# Patient Record
Sex: MALE | Race: WHITE | URBAN - METROPOLITAN AREA
[De-identification: names, ages, dates, MRNs, and addresses within clinical notes are randomized per-mention and may not be internally consistent; named-entity substitution may affect disease eponyms.]

---

## 2017-01-26 ENCOUNTER — APPOINTMENT (OUTPATIENT)
Dept: GENERAL RADIOLOGY | Facility: CLINIC | Age: 62
End: 2017-01-26
Attending: EMERGENCY MEDICINE
Payer: MEDICARE

## 2017-01-26 ENCOUNTER — APPOINTMENT (OUTPATIENT)
Dept: CT IMAGING | Facility: CLINIC | Age: 62
End: 2017-01-26
Attending: EMERGENCY MEDICINE
Payer: MEDICARE

## 2017-01-26 ENCOUNTER — HOSPITAL ENCOUNTER (EMERGENCY)
Facility: CLINIC | Age: 62
Discharge: ANOTHER HOSPITAL | End: 2017-01-26
Attending: EMERGENCY MEDICINE | Admitting: EMERGENCY MEDICINE
Payer: MEDICARE

## 2017-01-26 VITALS
SYSTOLIC BLOOD PRESSURE: 106 MMHG | OXYGEN SATURATION: 94 % | TEMPERATURE: 97 F | RESPIRATION RATE: 11 BRPM | DIASTOLIC BLOOD PRESSURE: 86 MMHG

## 2017-01-26 DIAGNOSIS — R55 SYNCOPE, UNSPECIFIED SYNCOPE TYPE: ICD-10-CM

## 2017-01-26 DIAGNOSIS — R56.9 CONVULSIONS, UNSPECIFIED CONVULSION TYPE (H): ICD-10-CM

## 2017-01-26 DIAGNOSIS — R00.1 SINUS BRADYCARDIA: ICD-10-CM

## 2017-01-26 LAB
ALBUMIN SERPL-MCNC: 3.2 G/DL (ref 3.4–5)
ALBUMIN UR-MCNC: NEGATIVE MG/DL
ALP SERPL-CCNC: 96 U/L (ref 40–150)
ALT SERPL W P-5'-P-CCNC: 71 U/L (ref 0–70)
ANION GAP SERPL CALCULATED.3IONS-SCNC: 13 MMOL/L (ref 6–17)
ANION GAP SERPL CALCULATED.3IONS-SCNC: 9 MMOL/L (ref 3–14)
APPEARANCE UR: CLEAR
APTT PPP: 26 SEC (ref 22–37)
AST SERPL W P-5'-P-CCNC: 44 U/L (ref 0–45)
BASOPHILS # BLD AUTO: 0.1 10E9/L (ref 0–0.2)
BASOPHILS NFR BLD AUTO: 1.4 %
BILIRUB SERPL-MCNC: 0.6 MG/DL (ref 0.2–1.3)
BILIRUB UR QL STRIP: NEGATIVE
BUN SERPL-MCNC: 21 MG/DL (ref 7–30)
BUN SERPL-MCNC: 27 MG/DL (ref 7–30)
CA-I BLD-SCNC: 4.6 MG/DL (ref 4.4–5.2)
CALCIUM SERPL-MCNC: 8.5 MG/DL (ref 8.5–10.1)
CHLORIDE BLD-SCNC: 99 MMOL/L (ref 94–109)
CHLORIDE SERPL-SCNC: 104 MMOL/L (ref 94–109)
CO2 BLD-SCNC: 28 MMOL/L (ref 20–32)
CO2 BLDCOV-SCNC: 28 MMOL/L (ref 21–28)
CO2 SERPL-SCNC: 26 MMOL/L (ref 20–32)
COLOR UR AUTO: ABNORMAL
CREAT BLD-MCNC: 0.9 MG/DL (ref 0.66–1.25)
CREAT SERPL-MCNC: 0.9 MG/DL (ref 0.66–1.25)
DIFFERENTIAL METHOD BLD: NORMAL
EOSINOPHIL # BLD AUTO: 0.2 10E9/L (ref 0–0.7)
EOSINOPHIL NFR BLD AUTO: 2.1 %
ERYTHROCYTE [DISTWIDTH] IN BLOOD BY AUTOMATED COUNT: 14.4 % (ref 10–15)
GFR SERPL CREATININE-BSD FRML MDRD: 86 ML/MIN/1.7M2
GFR SERPL CREATININE-BSD FRML MDRD: 86 ML/MIN/1.7M2
GLUCOSE BLD-MCNC: 130 MG/DL (ref 70–99)
GLUCOSE SERPL-MCNC: 124 MG/DL (ref 70–99)
GLUCOSE UR STRIP-MCNC: NEGATIVE MG/DL
HCT VFR BLD AUTO: 48.1 % (ref 40–53)
HCT VFR BLD CALC: 49 %PCV (ref 40–53)
HGB BLD CALC-MCNC: 16.7 G/DL (ref 13.3–17.7)
HGB BLD-MCNC: 15.8 G/DL (ref 13.3–17.7)
HGB UR QL STRIP: NEGATIVE
IMM GRANULOCYTES # BLD: 0 10E9/L (ref 0–0.4)
IMM GRANULOCYTES NFR BLD: 0.4 %
INR PPP: 1.06 (ref 0.86–1.14)
KETONES UR STRIP-MCNC: NEGATIVE MG/DL
LACTATE BLD-SCNC: 3.1 MMOL/L (ref 0.7–2.1)
LEUKOCYTE ESTERASE UR QL STRIP: NEGATIVE
LYMPHOCYTES # BLD AUTO: 3.8 10E9/L (ref 0.8–5.3)
LYMPHOCYTES NFR BLD AUTO: 48.1 %
MCH RBC QN AUTO: 31.1 PG (ref 26.5–33)
MCHC RBC AUTO-ENTMCNC: 32.8 G/DL (ref 31.5–36.5)
MCV RBC AUTO: 95 FL (ref 78–100)
MONOCYTES # BLD AUTO: 0.9 10E9/L (ref 0–1.3)
MONOCYTES NFR BLD AUTO: 10.7 %
MUCOUS THREADS #/AREA URNS LPF: PRESENT /LPF
NEUTROPHILS # BLD AUTO: 3 10E9/L (ref 1.6–8.3)
NEUTROPHILS NFR BLD AUTO: 37.3 %
NITRATE UR QL: NEGATIVE
NRBC # BLD AUTO: 0 10*3/UL
NRBC BLD AUTO-RTO: 0 /100
PCO2 BLDV: 54 MM HG (ref 40–50)
PH BLDV: 7.32 PH (ref 7.32–7.43)
PH UR STRIP: 6.5 PH (ref 5–7)
PLATELET # BLD AUTO: 281 10E9/L (ref 150–450)
PO2 BLDV: 30 MM HG (ref 25–47)
POTASSIUM BLD-SCNC: 4.5 MMOL/L (ref 3.4–5.3)
POTASSIUM SERPL-SCNC: 4.5 MMOL/L (ref 3.4–5.3)
PROT SERPL-MCNC: 6.5 G/DL (ref 6.8–8.8)
RBC # BLD AUTO: 5.08 10E12/L (ref 4.4–5.9)
RBC #/AREA URNS AUTO: 0 /HPF (ref 0–2)
SAO2 % BLDV FROM PO2: 52 %
SODIUM BLD-SCNC: 140 MMOL/L (ref 133–144)
SODIUM SERPL-SCNC: 139 MMOL/L (ref 133–144)
SP GR UR STRIP: 1.02 (ref 1–1.03)
TROPONIN I SERPL-MCNC: NORMAL UG/L (ref 0–0.04)
URN SPEC COLLECT METH UR: ABNORMAL
UROBILINOGEN UR STRIP-MCNC: NORMAL MG/DL (ref 0–2)
WBC # BLD AUTO: 7.9 10E9/L (ref 4–11)
WBC #/AREA URNS AUTO: 0 /HPF (ref 0–2)

## 2017-01-26 PROCEDURE — 71010 XR CHEST PORT 1 VW: CPT

## 2017-01-26 PROCEDURE — 82803 BLOOD GASES ANY COMBINATION: CPT

## 2017-01-26 PROCEDURE — 96361 HYDRATE IV INFUSION ADD-ON: CPT

## 2017-01-26 PROCEDURE — 96374 THER/PROPH/DIAG INJ IV PUSH: CPT | Mod: 59

## 2017-01-26 PROCEDURE — 80053 COMPREHEN METABOLIC PANEL: CPT | Performed by: EMERGENCY MEDICINE

## 2017-01-26 PROCEDURE — 85730 THROMBOPLASTIN TIME PARTIAL: CPT | Performed by: EMERGENCY MEDICINE

## 2017-01-26 PROCEDURE — 25500064 ZZH RX 255 OP 636: Performed by: EMERGENCY MEDICINE

## 2017-01-26 PROCEDURE — 80047 BASIC METABLC PNL IONIZED CA: CPT

## 2017-01-26 PROCEDURE — 84484 ASSAY OF TROPONIN QUANT: CPT | Performed by: EMERGENCY MEDICINE

## 2017-01-26 PROCEDURE — 25000128 H RX IP 250 OP 636: Performed by: EMERGENCY MEDICINE

## 2017-01-26 PROCEDURE — 81001 URINALYSIS AUTO W/SCOPE: CPT | Performed by: EMERGENCY MEDICINE

## 2017-01-26 PROCEDURE — 93005 ELECTROCARDIOGRAM TRACING: CPT

## 2017-01-26 PROCEDURE — 87040 BLOOD CULTURE FOR BACTERIA: CPT | Performed by: EMERGENCY MEDICINE

## 2017-01-26 PROCEDURE — 36415 COLL VENOUS BLD VENIPUNCTURE: CPT

## 2017-01-26 PROCEDURE — 70450 CT HEAD/BRAIN W/O DYE: CPT

## 2017-01-26 PROCEDURE — 83605 ASSAY OF LACTIC ACID: CPT

## 2017-01-26 PROCEDURE — 85610 PROTHROMBIN TIME: CPT | Performed by: EMERGENCY MEDICINE

## 2017-01-26 PROCEDURE — 85014 HEMATOCRIT: CPT

## 2017-01-26 PROCEDURE — 85025 COMPLETE CBC W/AUTO DIFF WBC: CPT | Performed by: EMERGENCY MEDICINE

## 2017-01-26 PROCEDURE — 99285 EMERGENCY DEPT VISIT HI MDM: CPT | Mod: 25

## 2017-01-26 PROCEDURE — 70498 CT ANGIOGRAPHY NECK: CPT

## 2017-01-26 PROCEDURE — 25000125 ZZHC RX 250: Performed by: EMERGENCY MEDICINE

## 2017-01-26 RX ORDER — IOPAMIDOL 755 MG/ML
500 INJECTION, SOLUTION INTRAVASCULAR ONCE
Status: COMPLETED | OUTPATIENT
Start: 2017-01-26 | End: 2017-01-26

## 2017-01-26 RX ORDER — LORAZEPAM 2 MG/ML
0.5 INJECTION INTRAMUSCULAR
Status: DISCONTINUED | OUTPATIENT
Start: 2017-01-26 | End: 2017-01-26 | Stop reason: HOSPADM

## 2017-01-26 RX ADMIN — SODIUM CHLORIDE 80 ML: 9 INJECTION, SOLUTION INTRAVENOUS at 12:34

## 2017-01-26 RX ADMIN — IOPAMIDOL 70 ML: 755 INJECTION, SOLUTION INTRAVENOUS at 12:34

## 2017-01-26 RX ADMIN — LORAZEPAM 0.5 MG: 2 INJECTION INTRAMUSCULAR; INTRAVENOUS at 12:23

## 2017-01-26 RX ADMIN — SODIUM CHLORIDE 1000 ML: 9 INJECTION, SOLUTION INTRAVENOUS at 14:35

## 2017-01-26 RX ADMIN — SODIUM CHLORIDE 1000 ML: 9 INJECTION, SOLUTION INTRAVENOUS at 12:55

## 2017-01-26 ASSESSMENT — ENCOUNTER SYMPTOMS
WEAKNESS: 1
FACIAL ASYMMETRY: 1

## 2017-01-26 NOTE — ED NOTES
Pt presents to ED with EMS who report around 1125 pt had a syncopal episode in a chair where he possibly had a seizure, states when EMS got there pt was UR, states pt has been waking up now more, states they noticed a left sided facial droop and pt was unable to hold bilateral arms up. Hx of DD. ABCs intact

## 2017-01-26 NOTE — ED NOTES
Bonny, pts other care giver arrived informing us that pt was recently started on Nmenda which has a possible side effect of seizures. Pt resting in cot with eyes closed, respirations equal and unlabored. Pt wakes up with verbal stimulation at this time, does not open eyes, pt has a bad left eye normally.

## 2017-01-26 NOTE — ED NOTES
Assisted with Pt. Ambulance triage, Applied monitoring devices (EKG, BP, and pulse ox) onto Patient. Portable monitor applied for transport.

## 2017-01-26 NOTE — ED NOTES
Caregiver from group home left. Give update at [phone] when pt is leaving for [place]. Pt currently resting in bed with eyes closed.

## 2017-01-26 NOTE — ED NOTES
Bed: ED03  Expected date: 1/26/17  Expected time: 11:55 AM  Means of arrival: Ambulance  Comments:  [facility]

## 2017-01-26 NOTE — ED NOTES
Pts care giver Duyen had to leave. Pt resting in cot with eyes closed, pts BP continues to be labile. Dr. Knox aware of this. Respirations equal and unlabored. Pt continues to be very drowsy Dr. Knox is aware of this.

## 2017-01-26 NOTE — ED PROVIDER NOTES
"  History     Chief Complaint:  Loss of Consciousness       HPI   Steve \"Jose\" Ed Soto is a 61 year old male with a history of Down syndrome, development delay, dementia, and thyroid disease who presents to the emergency department via EMS for evaluation of loss of consciousness and facial droop. He is typically nonverbal at baseline per EMS. The patient's caregiver at the group home states that the patient, who had just eaten lunch, had a syncopal episode at approximately 1125 this morning. His caregiver notes that the patient's body was rigid and stiff initially, but then relaxed and was unresponsive for approximately 10 minutes which is how EMS found him.  No reported incontinence or shaking movements.  EMS notes a left sided facial droop and left sided weakness.  Could not lift arms well or follow basic commands which he is normally able to do.  No traumatic injuries from the event.  Was not obviously choking at the time.  No incontinence.  Has a legal guardian, group home staff to try to get contact information for us.  History limited due to patient's dementia and nonverbal baseline.    Later during ED visit another group home staff member states that patient can say \"hi Gosia\" to the  and sing the happy birthday song but otherwise verbal communication is minimal.    Allergies:  NKDA     Medications:    levothyroxine (SYNTHROID, LEVOTHROID) 50 MCG tablet  Carbamide Peroxide (DEBROX OT)  lactase (LACTAID) 3000 UNIT tablet  Triamcinolone 0.1% cream  Artificial tears  Vitamin D    Past Medical History:    Developmental delay disorder  Down syndrome  Thyroid disease  Left eye blindness     Past Surgical History:    Cataract removal    Family History:    No past pertinent family history.     Social History:  Presents with his group home caregiver, Duyen.  Negative for tobacco use.  Negative for alcohol use.  Marital Status:  Single [1]    Review of Systems   Unable to perform ROS: Patient " nonverbal   Neurological: Positive for syncope, facial asymmetry and weakness.     Physical Exam     Patient Vitals for the past 24 hrs:   BP Temp Temp src Heart Rate Resp SpO2   01/26/17 1530 141/87 mmHg - - 62 13 100 %   01/26/17 1500 113/61 mmHg - - 46 9 100 %   01/26/17 1450 (!) 118/97 mmHg - - 61 12 100 %   01/26/17 1445 - - - 56 12 100 %   01/26/17 1441 136/69 mmHg - - 43 11 100 %   01/26/17 1440 92/55 mmHg - - 42 8 99 %   01/26/17 1430 (!) 86/57 mmHg - - 43 8 100 %   01/26/17 1426 125/71 mmHg - - 81 16 97 %   01/26/17 1425 (!) 83/59 mmHg - - 43 9 98 %   01/26/17 1421 97/57 mmHg - - 56 11 98 %   01/26/17 1415 92/53 mmHg - - 43 9 98 %   01/26/17 1410 105/58 mmHg - - 54 16 98 %   01/26/17 1405 125/82 mmHg - - 59 17 99 %   01/26/17 1355 114/86 mmHg - - - 26 98 %   01/26/17 1350 120/80 mmHg - - - 17 99 %   01/26/17 1345 120/70 mmHg - - 70 10 100 %   01/26/17 1330 91/55 mmHg - - 51 (!) 7 100 %   01/26/17 1325 91/66 mmHg - - 51 8 100 %   01/26/17 1320 105/57 mmHg - - - 10 100 %   01/26/17 1315 108/47 mmHg - - 54 (!) 7 100 %   01/26/17 1300 100/55 mmHg - - 47 9 98 %   01/26/17 1257 111/74 mmHg - - 61 18 100 %   01/26/17 1249 90/68 mmHg - - 47 21 100 %   01/26/17 1218 120/62 mmHg - - 52 - -   01/26/17 1214 - 97  F (36.1  C) Temporal - 18 100 %         Physical Exam  Head:  The scalp, face, and head appear normal and symmetric  Eyes:  Sclera white; R PERRL; L clouded over; does not follow light to help assess EOM  ENT:    External ears and nares normal  Neck:  No meningismus or bruit  CV:  Rate as above with regular rhythm   Resp:  Breath sounds clear and equal bilaterally  GI:  Abdomen is soft, non-tender, non-distended  MS:  Moves all extremities spontaneously  Neuro: Nonverbal, eyes open spontaneously, unable to follow commands or mimic behavior    Purposeful movements in both arms (picking at things, lifting them up), moving both legs while seated in bed    Sensation intact x4 to painful stimulus.      Cranial  nerves II-XII assessment limited due to inability to follow commands.  No obvious assymetry to the face.      NIHSS: Unable to perform    Emergency Department Course   ECG:  Indication: Syncope  Time: 1211  Vent. Rate 59 bpm. MO interval 148. QRS duration 66. QT/QTc 392/388. P-R-T axis 16 9 -13.  Sinus bradycardia. Otherwise normal ECG. Read time: 1227    Imaging:  Radiographic findings were communicated with the patient and caregiver who voiced understanding of the findings.    CT Head without contrast:   1. Atrophy and chronic white matter disease.  2. Nothing acute.  3. CT angiogram will follow.  As per radiology.    CT Head Neck Angio with and without contrast:   Normal CT angiogram of the head and neck. As per radiology.    XR Chest Portable 1 view:   Shallow inspiration. There may be mild cardiomegaly.  Pulmonary vasculature is indistinct, possibly engorged. Correlate  clinically.  As per radiology.       Laboratory:  CBC: WBC: 7.9, HGB: 15.8, PLT: 281  1215 ISTAT BMP: Glucose 130 (H), o/w WNL (Creatinine: 0.9)  CMP: Glucose 124 (H), Albumin 3.2 (L), protein total 6.5 (L), ALT 71 (H), o/w WNL (Creatinine: 0.90)    1223 ISTAT VBG: pH 7.32 / PCO2 54 (H) / PO2 30 / Bicarb 28 / O2 sat 52   Lactic Acid: 3.1 (H)    1212 Troponin: <0.015    INR:1.06    PTT:26    UA with micro: mucous present, o/w negative    Blood culture x2: pending    Interventions:  1223 Lorazepam, 0.5 mg, IV injection  1255 NS 1L IV  1435 NS 1L IV    Emergency Department Course:  Nursing notes and vitals reviewed. I performed an exam of the patient as documented above.     EKG obtained in the ED, see results above.     IV inserted. Medicine administered as documented above. Blood drawn. This was sent to the lab for further testing, results above.    The patient was sent for a CTA Head and Neck while in the emergency department, findings above.     1244 I spoke with OSCAR Jones, who accompanied the patient to CT. She notes that the patient is  bradycardic in the high 30s. I rechecked him upon return to ED, rate now 51.    1309 I consulted with Dr. Brock, radiology, regarding the results of the patient's CTA imaging here in the ED.     1426 I rechecked the patient.    1420 I consulted with Raquel Bernal, the patient's guardian, regarding the patient's history and presentation here in the emergency department. She expressed a desire for admission to the [facility] system.    1521 I consulted with Dr. Barahona, a colleague of Dr. Li, the patient's PCP, regarding the patient's history and presentation here in the emergency department.    1526 I again spoke with Raquel Bernal, the patient's guardian and provided an update on the patient's ED course.  Requested transfer to [place].    I spoke with Dr Li who had been at a different clinic location today.  States only possible medication with bradycardia effects is the patient's eye drops.  Recently started Namenda per Neurology for dementia with myoclonus.  Never any witnessed seizures.    1549  I consulted with the hospitalist services at [facility]. Will have a delay in transfer due to need for sitter.    Findings and plan explained to the guardian and caregiver. Signed out to Dr. Mcdaniel pending transfer to [facility].  Anticipated bed availability and sitter availability at 7pm.    Impression & Plan    Medical Decision Making:  Steve Valera is a 61 year old male who is here for evaluation of a syncopal event with possible seizure activity associated with it. There was weakness afterwards with some unilateral changes concerning for acute onset stroke or Edilberto's paralysis. I was also concerned for intracranial bleed, intracranial mass, electrolyte abnormality, or a syncopal event. Patient is not a TPA candidate due to concern for seizure activity at the time and no obvious lateralizing deficits here.  STAT CT imaging obtained after confirming appropriate renal function on POC testing.  Discussed with  radiology - no abnormalities.  Will need additional neurology involvement for seizure vs TIA.  He is persistently bradycardiac in the department, concerning that he may have had a cardiac dysrhythmia contributing to his loss of consciousness today.  Lowest rate noted was 38 by the nurse during CT.  Lactic was elevated but no SIRS or infections - not c/w sepsis.  May be secondary to syncope vs seizure.  The remainder of his blood work is negative for any contributing features. Patient was discussed with his legal guardian, Raquel Bernal, who states that he lives in [place] and is normally is seen at the [facility], which would be her preference for admission if possible.  Over multiple discussions with physicians it is clear that [facility] is not appropriate due to lack of inpatient neurology or cardiology consultation.  If persistent bradycardia then EP cardiology involvement or consideration of pacemaker is appropriate.  Transfer arranged to [facility] and will be delayed due to need for sitter as patient is prone to wandering at night.      Diagnosis:    ICD-10-CM    1. Syncope, unspecified syncope type R55    2. Convulsions, unspecified convulsion type (H) R56.9    3. Sinus bradycardia R00.1        Disposition:  Signed out to Dr. Mcdaniel pending transfer to [facility]  IMelonie, am serving as a scribe on 1/26/2017 at 12:09 PM to personally document services performed by Sparkle Knox MD based on my observations and the provider's statements to me.     Melonie Villalta  1/26/2017   [facility] EMERGENCY DEPARTMENT        Sparkle Knox MD  01/26/17 1800

## 2017-01-26 NOTE — ED NOTES
Pt with saturated brief and bedding, changed and cleaned. In no apparent distress, caregiver at bedside.

## 2017-01-26 NOTE — ED NOTES
Pt went to Ct with this nurse on monitors, pt was in CT scanner, had 3 episodes of 5-10 seconds of heart rates in the 30's, Pts BP decreased to 88/56, pt yells out when stimulated. Dr. Knox informed of this. Pt back to room and placed on monitors.

## 2017-01-27 LAB — INTERPRETATION ECG - MUSE: NORMAL

## 2017-02-01 LAB
BACTERIA SPEC CULT: NO GROWTH
BACTERIA SPEC CULT: NO GROWTH
Lab: NORMAL
Lab: NORMAL
MICRO REPORT STATUS: NORMAL
MICRO REPORT STATUS: NORMAL
SPECIMEN SOURCE: NORMAL
SPECIMEN SOURCE: NORMAL

## 2017-02-02 ENCOUNTER — TELEPHONE (OUTPATIENT)
Dept: NEUROLOGY | Facility: CLINIC | Age: 62
End: 2017-02-02

## 2017-02-02 NOTE — TELEPHONE ENCOUNTER
Keppra for 2 seizures ; agree with treatment ; 500mg  po bid; asked to see in follow up. To see DrRotty tomorrow, if break thru seizures go to [facility]

## 2017-02-07 ENCOUNTER — MEDICAL CORRESPONDENCE (OUTPATIENT)
Dept: HEALTH INFORMATION MANAGEMENT | Facility: CLINIC | Age: 62
End: 2017-02-07